# Patient Record
Sex: FEMALE | Race: WHITE | Employment: FULL TIME | URBAN - METROPOLITAN AREA
[De-identification: names, ages, dates, MRNs, and addresses within clinical notes are randomized per-mention and may not be internally consistent; named-entity substitution may affect disease eponyms.]

---

## 2020-09-13 ENCOUNTER — APPOINTMENT (OUTPATIENT)
Dept: GENERAL RADIOLOGY | Age: 45
End: 2020-09-13
Payer: COMMERCIAL

## 2020-09-13 ENCOUNTER — HOSPITAL ENCOUNTER (OUTPATIENT)
Age: 45
Setting detail: OBSERVATION
Discharge: HOME | End: 2020-09-14
Attending: EMERGENCY MEDICINE | Admitting: INTERNAL MEDICINE
Payer: COMMERCIAL

## 2020-09-13 PROBLEM — R07.9 CHEST PAIN: Status: ACTIVE | Noted: 2020-09-13

## 2020-09-13 LAB
ACETAMINOPHEN LEVEL: <5 MCG/ML (ref 10–30)
ALBUMIN SERPL-MCNC: 4 G/DL (ref 3.5–5.2)
ALP BLD-CCNC: 76 U/L (ref 35–104)
ALT SERPL-CCNC: 13 U/L (ref 0–32)
AMPHETAMINE SCREEN, URINE: NOT DETECTED
ANION GAP SERPL CALCULATED.3IONS-SCNC: 14 MMOL/L (ref 7–16)
AST SERPL-CCNC: 16 U/L (ref 0–31)
BARBITURATE SCREEN URINE: NOT DETECTED
BASOPHILS ABSOLUTE: 0.03 E9/L (ref 0–0.2)
BASOPHILS RELATIVE PERCENT: 0.5 % (ref 0–2)
BENZODIAZEPINE SCREEN, URINE: NOT DETECTED
BILIRUB SERPL-MCNC: <0.2 MG/DL (ref 0–1.2)
BUN BLDV-MCNC: 11 MG/DL (ref 6–20)
CALCIUM SERPL-MCNC: 8.9 MG/DL (ref 8.6–10.2)
CANNABINOID SCREEN URINE: POSITIVE
CHLORIDE BLD-SCNC: 99 MMOL/L (ref 98–107)
CK MB: 2.1 NG/ML (ref 0–4.3)
CO2: 22 MMOL/L (ref 22–29)
COCAINE METABOLITE SCREEN URINE: NOT DETECTED
CREAT SERPL-MCNC: 0.6 MG/DL (ref 0.5–1)
EOSINOPHILS ABSOLUTE: 0.23 E9/L (ref 0.05–0.5)
EOSINOPHILS RELATIVE PERCENT: 3.5 % (ref 0–6)
ETHANOL: <10 MG/DL (ref 0–0.08)
FENTANYL SCREEN, URINE: NOT DETECTED
GFR AFRICAN AMERICAN: >60
GFR NON-AFRICAN AMERICAN: >60 ML/MIN/1.73
GLUCOSE BLD-MCNC: 108 MG/DL (ref 74–99)
HBA1C MFR BLD: 5.2 % (ref 4–5.6)
HCT VFR BLD CALC: 29.8 % (ref 34–48)
HEMOGLOBIN: 9.4 G/DL (ref 11.5–15.5)
IMMATURE GRANULOCYTES #: 0.03 E9/L
IMMATURE GRANULOCYTES %: 0.5 % (ref 0–5)
LYMPHOCYTES ABSOLUTE: 1.97 E9/L (ref 1.5–4)
LYMPHOCYTES RELATIVE PERCENT: 29.9 % (ref 20–42)
Lab: ABNORMAL
MCH RBC QN AUTO: 26.7 PG (ref 26–35)
MCHC RBC AUTO-ENTMCNC: 31.5 % (ref 32–34.5)
MCV RBC AUTO: 84.7 FL (ref 80–99.9)
METHADONE SCREEN, URINE: NOT DETECTED
MONOCYTES ABSOLUTE: 0.43 E9/L (ref 0.1–0.95)
MONOCYTES RELATIVE PERCENT: 6.5 % (ref 2–12)
NEUTROPHILS ABSOLUTE: 3.89 E9/L (ref 1.8–7.3)
NEUTROPHILS RELATIVE PERCENT: 59.1 % (ref 43–80)
OPIATE SCREEN URINE: NOT DETECTED
OXYCODONE URINE: NOT DETECTED
PDW BLD-RTO: 13.4 FL (ref 11.5–15)
PHENCYCLIDINE SCREEN URINE: NOT DETECTED
PLATELET # BLD: 348 E9/L (ref 130–450)
PMV BLD AUTO: 10 FL (ref 7–12)
POTASSIUM SERPL-SCNC: 3.8 MMOL/L (ref 3.5–5)
RBC # BLD: 3.52 E12/L (ref 3.5–5.5)
SALICYLATE, SERUM: <0.3 MG/DL (ref 0–30)
SODIUM BLD-SCNC: 135 MMOL/L (ref 132–146)
TOTAL CK: 123 U/L (ref 20–180)
TOTAL PROTEIN: 6.9 G/DL (ref 6.4–8.3)
TRICYCLIC ANTIDEPRESSANTS SCREEN SERUM: NEGATIVE NG/ML
TROPONIN: <0.01 NG/ML (ref 0–0.03)
TROPONIN: <0.01 NG/ML (ref 0–0.03)
WBC # BLD: 6.6 E9/L (ref 4.5–11.5)

## 2020-09-13 PROCEDURE — 80307 DRUG TEST PRSMV CHEM ANLYZR: CPT

## 2020-09-13 PROCEDURE — 99283 EMERGENCY DEPT VISIT LOW MDM: CPT

## 2020-09-13 PROCEDURE — 96372 THER/PROPH/DIAG INJ SC/IM: CPT

## 2020-09-13 PROCEDURE — 2580000003 HC RX 258: Performed by: STUDENT IN AN ORGANIZED HEALTH CARE EDUCATION/TRAINING PROGRAM

## 2020-09-13 PROCEDURE — 6360000002 HC RX W HCPCS: Performed by: INTERNAL MEDICINE

## 2020-09-13 PROCEDURE — 36415 COLL VENOUS BLD VENIPUNCTURE: CPT

## 2020-09-13 PROCEDURE — 84484 ASSAY OF TROPONIN QUANT: CPT

## 2020-09-13 PROCEDURE — 82553 CREATINE MB FRACTION: CPT

## 2020-09-13 PROCEDURE — G0378 HOSPITAL OBSERVATION PER HR: HCPCS

## 2020-09-13 PROCEDURE — 85025 COMPLETE CBC W/AUTO DIFF WBC: CPT

## 2020-09-13 PROCEDURE — 82550 ASSAY OF CK (CPK): CPT

## 2020-09-13 PROCEDURE — 93005 ELECTROCARDIOGRAM TRACING: CPT | Performed by: STUDENT IN AN ORGANIZED HEALTH CARE EDUCATION/TRAINING PROGRAM

## 2020-09-13 PROCEDURE — G0480 DRUG TEST DEF 1-7 CLASSES: HCPCS

## 2020-09-13 PROCEDURE — 80053 COMPREHEN METABOLIC PANEL: CPT

## 2020-09-13 PROCEDURE — 83036 HEMOGLOBIN GLYCOSYLATED A1C: CPT

## 2020-09-13 PROCEDURE — 71046 X-RAY EXAM CHEST 2 VIEWS: CPT

## 2020-09-13 RX ORDER — DOCUSATE SODIUM 100 MG/1
100 CAPSULE, LIQUID FILLED ORAL NIGHTLY
Status: DISCONTINUED | OUTPATIENT
Start: 2020-09-13 | End: 2020-09-14 | Stop reason: HOSPADM

## 2020-09-13 RX ORDER — ONDANSETRON 2 MG/ML
4 INJECTION INTRAMUSCULAR; INTRAVENOUS ONCE
Status: DISCONTINUED | OUTPATIENT
Start: 2020-09-13 | End: 2020-09-14 | Stop reason: HOSPADM

## 2020-09-13 RX ORDER — ONDANSETRON 2 MG/ML
4 INJECTION INTRAMUSCULAR; INTRAVENOUS EVERY 6 HOURS PRN
Status: DISCONTINUED | OUTPATIENT
Start: 2020-09-13 | End: 2020-09-14 | Stop reason: HOSPADM

## 2020-09-13 RX ORDER — RIBOFLAVIN (VITAMIN B2) 100 MG
100 TABLET ORAL DAILY
COMMUNITY

## 2020-09-13 RX ORDER — PANTOPRAZOLE SODIUM 40 MG/1
40 TABLET, DELAYED RELEASE ORAL
Status: DISCONTINUED | OUTPATIENT
Start: 2020-09-14 | End: 2020-09-14 | Stop reason: HOSPADM

## 2020-09-13 RX ORDER — ACETAMINOPHEN 325 MG/1
650 TABLET ORAL EVERY 4 HOURS PRN
Status: DISCONTINUED | OUTPATIENT
Start: 2020-09-13 | End: 2020-09-14 | Stop reason: HOSPADM

## 2020-09-13 RX ORDER — DULOXETIN HYDROCHLORIDE 60 MG/1
60 CAPSULE, DELAYED RELEASE ORAL DAILY
COMMUNITY

## 2020-09-13 RX ORDER — 0.9 % SODIUM CHLORIDE 0.9 %
1000 INTRAVENOUS SOLUTION INTRAVENOUS ONCE
Status: COMPLETED | OUTPATIENT
Start: 2020-09-13 | End: 2020-09-13

## 2020-09-13 RX ORDER — LORATADINE 10 MG/1
10 TABLET ORAL DAILY
COMMUNITY

## 2020-09-13 RX ORDER — DULOXETIN HYDROCHLORIDE 60 MG/1
60 CAPSULE, DELAYED RELEASE ORAL DAILY
Status: DISCONTINUED | OUTPATIENT
Start: 2020-09-13 | End: 2020-09-14 | Stop reason: HOSPADM

## 2020-09-13 RX ORDER — ASPIRIN 81 MG/1
81 TABLET ORAL DAILY
Status: DISCONTINUED | OUTPATIENT
Start: 2020-09-13 | End: 2020-09-14 | Stop reason: HOSPADM

## 2020-09-13 RX ORDER — CETIRIZINE HYDROCHLORIDE 10 MG/1
10 TABLET ORAL DAILY
Status: DISCONTINUED | OUTPATIENT
Start: 2020-09-13 | End: 2020-09-14 | Stop reason: HOSPADM

## 2020-09-13 RX ADMIN — ENOXAPARIN SODIUM 40 MG: 40 INJECTION SUBCUTANEOUS at 21:42

## 2020-09-13 RX ADMIN — SODIUM CHLORIDE 1000 ML: 9 INJECTION, SOLUTION INTRAVENOUS at 17:19

## 2020-09-13 ASSESSMENT — ENCOUNTER SYMPTOMS
PHOTOPHOBIA: 0
COUGH: 0
VISUAL CHANGE: 0
SHORTNESS OF BREATH: 0
WHEEZING: 0
SORE THROAT: 0
RHINORRHEA: 0
CHEST TIGHTNESS: 1
TROUBLE SWALLOWING: 0
NAUSEA: 1
BLOOD IN STOOL: 0
DIARRHEA: 0
VOICE CHANGE: 0
VOMITING: 0

## 2020-09-13 ASSESSMENT — HEART SCORE: ECG: 1

## 2020-09-13 ASSESSMENT — PAIN DESCRIPTION - ORIENTATION: ORIENTATION: MID

## 2020-09-13 ASSESSMENT — PAIN SCALES - GENERAL: PAINLEVEL_OUTOF10: 5

## 2020-09-13 ASSESSMENT — PAIN DESCRIPTION - DESCRIPTORS: DESCRIPTORS: TIGHTNESS

## 2020-09-13 ASSESSMENT — PAIN DESCRIPTION - PAIN TYPE: TYPE: ACUTE PAIN

## 2020-09-13 ASSESSMENT — PAIN DESCRIPTION - LOCATION: LOCATION: CHEST

## 2020-09-13 NOTE — H&P
has diabetes and heart disease,, mom has RA  REVIEW OF SYSTEMS:   Pertinent positives as noted in the HPI. All other systems reviewed and negative. PHYSICAL EXAM:    BP (!) 150/98   Pulse 100   Temp 98.7 °F (37.1 °C) (Temporal)   Resp 16   Ht 5' 6\" (1.676 m)   Wt 220 lb (99.8 kg)   SpO2 99%   BMI 35.51 kg/m²     General appearance:  No apparent distress, appears stated age and cooperative. HEENT:  Normal cephalic, atraumatic without obvious deformity. Pupils equal, round, and reactive to light. Extra ocular muscles intact. Conjunctivae/corneas clear. Neck: Supple, with full range of motion. No jugular venous distention. Trachea midline. Respiratory:  Normal respiratory effort. Clear to auscultation, bilaterally without Rales/Wheezes/Rhonchi. Cardiovascular:  Regular rate and rhythm with normal S1/S2 without murmurs, rubs or gallops. Abdomen: Soft, non-tender, non-distended with normal bowel sounds. Musculoskeletal:  No clubbing, cyanosis or edema bilaterally. Full range of motion without deformity. Skin: Skin color, texture, turgor normal.  No rashes or lesions. Neurologic:  Neurovascularly intact without any focal sensory/motor deficits. Cranial nerves: II-XII intact, grossly non-focal.  Psychiatric:  Alert and oriented, thought content appropriate, normal insight  Capillary Refill: Brisk,< 3 seconds   Peripheral Pulses: +2 palpable, equal bilaterally       Labs:     Recent Labs     09/13/20  1602   WBC 6.6   HGB 9.4*   HCT 29.8*        Recent Labs     09/13/20  1602      K 3.8   CL 99   CO2 22   BUN 11   CREATININE 0.6   CALCIUM 8.9     Recent Labs     09/13/20  1602   AST 16   ALT 13   BILITOT <0.2   ALKPHOS 76     No results for input(s): INR in the last 72 hours.   Recent Labs     09/13/20  1602   TROPONINI <0.01       Urinalysis:    No results found for: Donneta Pott, BACTERIA, RBCUA, BLOODU, [address], GLUCOSEU    Radiology:     CXR: I have reviewed the CXR with the following interpretation:  XR CHEST (2 VW)   Final Result   Minimal atelectasis or scarring in the lower left lung. Otherwise, the   lungs are clear. ASSESSMENT:    Active Hospital Problems    Diagnosis Date Noted    Chest pain [R07.9] 09/13/2020   depression   palpitations  Marijuana use   tobacco use   Asthma without exacerbation  hyperglycemia  PLAN:  Card  Cycle enzymes  Home meds  aspirin    DVT Prophylaxis: *lovenox*  Diet: cardiac  Code Status:  Full code    PT/OT Eval Status: *na    Dispo - *home    Electronically signed by Skyla Loza DO on 9/13/2020 at 6:15 PM Tim       Thank you Kristine Emerson DO for the opportunity to be involved in this patient's care. If you have any questions or concerns please feel free to contact me at [phone].

## 2020-09-13 NOTE — ED PROVIDER NOTES
Patient is a 78-year-old female that presents the emergency department for evaluation of dizziness. Patient states that late last night around 2 AM she had chest tightness in the center of her chest that woke her up. She took Tums and with discomfort improved. States she has not had any chest tightness since that time. Patient states that she did feel off a little bit throughout the day but is unable to further describe this sensation. She states that roughly 35 minutes prior to arrival she took a hit from a vapor. Patient states that she has done this in the past however is concerned that the baby may have been tainted. Roughly 15 minutes after taking the hip she had sudden onset of dizziness which she describes as a lightheaded sensation. Nothing seemed to make it better or worse. Is been constant and mild in severity. She is not fallen or hit her head. She admits to some numbness and tingling of the shoulders bilaterally. She is never had this happen before. She also admits to mild nausea without vomiting. She denies chest pain, shortness of breath, fever, chills, cough, congestion, abdominal pain, change in bowel habits, change in urinary habit. The history is provided by the patient. Dizziness   Quality:  Lightheadedness  Severity:  Moderate  Onset quality:  Sudden  Duration: 25 minutes. Timing:  Constant  Progression:  Unchanged  Chronicity:  New  Relieved by:  Lying down and closing eyes  Worsened by:  Nothing  Ineffective treatments:  None tried  Associated symptoms: nausea    Associated symptoms: no blood in stool, no chest pain, no diarrhea, no headaches, no palpitations, no shortness of breath, no syncope, no vision changes, no vomiting and no weakness    Risk factors: no hx of stroke, no hx of vertigo, no Meniere's disease, no multiple medications and no new medications         Review of Systems   Constitutional: Negative for chills, diaphoresis, fatigue and fever.    HENT: Negative for congestion, postnasal drip, rhinorrhea, sore throat, trouble swallowing and voice change. Eyes: Negative for photophobia and visual disturbance. Respiratory: Positive for chest tightness. Negative for cough, shortness of breath and wheezing. Cardiovascular: Negative for chest pain, palpitations, leg swelling and syncope. Gastrointestinal: Positive for nausea. Negative for blood in stool, diarrhea and vomiting. Genitourinary: Negative for decreased urine volume, difficulty urinating, dysuria, flank pain, frequency and urgency. Musculoskeletal: Negative for myalgias. Skin: Negative for pallor and rash. Neurological: Positive for dizziness and light-headedness. Negative for tremors, seizures, syncope, facial asymmetry, speech difficulty, weakness, numbness and headaches. Physical Exam  Vitals signs and nursing note reviewed. Constitutional:       General: She is not in acute distress. Appearance: Normal appearance. She is obese. She is not ill-appearing or diaphoretic. HENT:      Head: Normocephalic and atraumatic. Mouth/Throat:      Mouth: Mucous membranes are moist.   Eyes:      Extraocular Movements: Extraocular movements intact. Conjunctiva/sclera: Conjunctivae normal.      Comments: Patient has slight anisocoria with a slightly larger right pupil than the left which patient states is chronic. They are both reactive to light and accommodation. Cardiovascular:      Rate and Rhythm: Normal rate and regular rhythm. Pulses: Normal pulses. Heart sounds: Normal heart sounds. No murmur. Pulmonary:      Effort: Pulmonary effort is normal. No respiratory distress. Breath sounds: Normal breath sounds. No wheezing or rhonchi. Chest:      Chest wall: No tenderness. Abdominal:      General: Abdomen is flat. Palpations: Abdomen is soft. Tenderness: There is no abdominal tenderness. There is no guarding or rebound.    Musculoskeletal:         General: No swelling or tenderness. Lymphadenopathy:      Cervical: No cervical adenopathy. Neurological:      Mental Status: She is alert and oriented to person, place, and time. Cranial Nerves: No cranial nerve deficit. Sensory: Sensory deficit present. Motor: No weakness. Procedures     MDM  Number of Diagnoses or Management Options  Chest pain, unspecified type:   Dizziness:   Diagnosis management comments: Patient is a 66-year-old female that presents emergency department for evaluation of dizziness. Patient resting comfortably and in no apparent distress on exam.  Concern that dizziness may be cardiogenic in nature given patient's history of syncopal episodes and current cardiac work-up of a loop recorder. Blood work was ordered which was unremarkable including normal troponin and normal electrolytes. EKG showed normal sinus rhythm with LVH and an age-indeterminate inferior and anterior infarct. No prior EKGs were in the system to compare. Chest x-ray showed no signs of pneumomediastinum, pneumothorax, pneumonia, widened mediastinum. Given patient's significant cardiac history she was admitted for observation and cardiac evaluation. Patient agreeable to plan. Discussed case with hospitalist who agreed to admit patient. She remained hemodynamically stable throughout stay in the emergency department.               --------------------------------------------- PAST HISTORY ---------------------------------------------  Past Medical History:  has no past medical history on file. Past Surgical History:  has a past surgical history that includes knee surgery and Tubal ligation. Social History:  reports that she has been smoking. She does not have any smokeless tobacco history on file. She reports current alcohol use. She reports that she does not use drugs. Family History: family history is not on file. The patients home medications have been reviewed.     Allergies: Imitrex [sumatriptan];  Amoxicillin; and Pcn [penicillins]    -------------------------------------------------- RESULTS -------------------------------------------------    LABS:  Results for orders placed or performed during the hospital encounter of 09/13/20   CBC Auto Differential   Result Value Ref Range    WBC 6.6 4.5 - 11.5 E9/L    RBC 3.52 3.50 - 5.50 E12/L    Hemoglobin 9.4 (L) 11.5 - 15.5 g/dL    Hematocrit 29.8 (L) 34.0 - 48.0 %    MCV 84.7 80.0 - 99.9 fL    MCH 26.7 26.0 - 35.0 pg    MCHC 31.5 (L) 32.0 - 34.5 %    RDW 13.4 11.5 - 15.0 fL    Platelets 771 965 - 267 E9/L    MPV 10.0 7.0 - 12.0 fL    Neutrophils % 59.1 43.0 - 80.0 %    Immature Granulocytes % 0.5 0.0 - 5.0 %    Lymphocytes % 29.9 20.0 - 42.0 %    Monocytes % 6.5 2.0 - 12.0 %    Eosinophils % 3.5 0.0 - 6.0 %    Basophils % 0.5 0.0 - 2.0 %    Neutrophils Absolute 3.89 1.80 - 7.30 E9/L    Immature Granulocytes # 0.03 E9/L    Lymphocytes Absolute 1.97 1.50 - 4.00 E9/L    Monocytes Absolute 0.43 0.10 - 0.95 E9/L    Eosinophils Absolute 0.23 0.05 - 0.50 E9/L    Basophils Absolute 0.03 0.00 - 0.20 E9/L   Comprehensive Metabolic Panel   Result Value Ref Range    Sodium 135 132 - 146 mmol/L    Potassium 3.8 3.5 - 5.0 mmol/L    Chloride 99 98 - 107 mmol/L    CO2 22 22 - 29 mmol/L    Anion Gap 14 7 - 16 mmol/L    Glucose 108 (H) 74 - 99 mg/dL    BUN 11 6 - 20 mg/dL    CREATININE 0.6 0.5 - 1.0 mg/dL    GFR Non-African American >60 >=60 mL/min/1.73    GFR African American >60     Calcium 8.9 8.6 - 10.2 mg/dL    Total Protein 6.9 6.4 - 8.3 g/dL    Alb 4.0 3.5 - 5.2 g/dL    Total Bilirubin <0.2 0.0 - 1.2 mg/dL    Alkaline Phosphatase 76 35 - 104 U/L    ALT 13 0 - 32 U/L    AST 16 0 - 31 U/L   Troponin   Result Value Ref Range    Troponin <0.01 0.00 - 0.03 ng/mL   Serum Drug Screen   Result Value Ref Range    Ethanol Lvl <10 mg/dL    Acetaminophen Level <5.0 (L) 10.0 - 87.8 mcg/mL    Salicylate, Serum <4.3 0.0 - 30.0 mg/dL    TCA Scrn NEGATIVE Cutoff:300 ng/mL answered at this time and they are agreeable with the plan of admission.    --------------------------------- ADDITIONAL PROVIDER NOTES ---------------------------------  Consultations:  Time: 0138. Spoke with Dr. Kiki Galeana. Discussed case. They will admit the patient. This patient's ED course included: a personal history and physicial examination, re-evaluation prior to disposition, multiple bedside re-evaluations, cardiac monitoring and continuous pulse oximetry    This patient has remained hemodynamically stable during their ED course. Diagnosis:  1. Dizziness    2. Chest pain, unspecified type        Disposition:  Patient's disposition: Admit to telemetry  Patient's condition is stable.          Eric Gregorio.,   Resident  09/13/20 [id number]

## 2020-09-14 ENCOUNTER — APPOINTMENT (OUTPATIENT)
Dept: NON INVASIVE DIAGNOSTICS | Age: 45
End: 2020-09-14
Payer: COMMERCIAL

## 2020-09-14 VITALS
WEIGHT: 220 LBS | SYSTOLIC BLOOD PRESSURE: 131 MMHG | TEMPERATURE: 96.5 F | HEIGHT: 66 IN | DIASTOLIC BLOOD PRESSURE: 71 MMHG | OXYGEN SATURATION: 96 % | BODY MASS INDEX: 35.36 KG/M2 | RESPIRATION RATE: 16 BRPM | HEART RATE: 78 BPM

## 2020-09-14 PROBLEM — F12.90 MARIJUANA USE: Status: ACTIVE | Noted: 2020-09-14

## 2020-09-14 LAB
ANION GAP SERPL CALCULATED.3IONS-SCNC: 14 MMOL/L (ref 7–16)
BUN BLDV-MCNC: 8 MG/DL (ref 6–20)
CALCIUM SERPL-MCNC: 8.5 MG/DL (ref 8.6–10.2)
CHLORIDE BLD-SCNC: 102 MMOL/L (ref 98–107)
CK MB: 1.9 NG/ML (ref 0–4.3)
CO2: 23 MMOL/L (ref 22–29)
CREAT SERPL-MCNC: 0.7 MG/DL (ref 0.5–1)
EKG ATRIAL RATE: 92 BPM
EKG P AXIS: 33 DEGREES
EKG P-R INTERVAL: 164 MS
EKG Q-T INTERVAL: 372 MS
EKG QRS DURATION: 90 MS
EKG QTC CALCULATION (BAZETT): 460 MS
EKG R AXIS: 5 DEGREES
EKG T AXIS: -2 DEGREES
EKG VENTRICULAR RATE: 92 BPM
GFR AFRICAN AMERICAN: >60
GFR NON-AFRICAN AMERICAN: >60 ML/MIN/1.73
GLUCOSE BLD-MCNC: 102 MG/DL (ref 74–99)
POTASSIUM SERPL-SCNC: 4 MMOL/L (ref 3.5–5)
SODIUM BLD-SCNC: 139 MMOL/L (ref 132–146)
TOTAL CK: 120 U/L (ref 20–180)
TROPONIN: <0.01 NG/ML (ref 0–0.03)

## 2020-09-14 PROCEDURE — 80048 BASIC METABOLIC PNL TOTAL CA: CPT

## 2020-09-14 PROCEDURE — 99244 OFF/OP CNSLTJ NEW/EST MOD 40: CPT | Performed by: INTERNAL MEDICINE

## 2020-09-14 PROCEDURE — G0378 HOSPITAL OBSERVATION PER HR: HCPCS

## 2020-09-14 PROCEDURE — 6370000000 HC RX 637 (ALT 250 FOR IP): Performed by: INTERNAL MEDICINE

## 2020-09-14 PROCEDURE — 93018 CV STRESS TEST I&R ONLY: CPT | Performed by: INTERNAL MEDICINE

## 2020-09-14 PROCEDURE — 84484 ASSAY OF TROPONIN QUANT: CPT

## 2020-09-14 PROCEDURE — 82550 ASSAY OF CK (CPK): CPT

## 2020-09-14 PROCEDURE — 93016 CV STRESS TEST SUPVJ ONLY: CPT | Performed by: INTERNAL MEDICINE

## 2020-09-14 PROCEDURE — 36415 COLL VENOUS BLD VENIPUNCTURE: CPT

## 2020-09-14 PROCEDURE — 93017 CV STRESS TEST TRACING ONLY: CPT

## 2020-09-14 PROCEDURE — 82553 CREATINE MB FRACTION: CPT

## 2020-09-14 PROCEDURE — 93010 ELECTROCARDIOGRAM REPORT: CPT | Performed by: INTERNAL MEDICINE

## 2020-09-14 RX ADMIN — PANTOPRAZOLE SODIUM 40 MG: 40 TABLET, DELAYED RELEASE ORAL at 06:46

## 2020-09-14 RX ADMIN — ASPIRIN 81 MG: 81 TABLET, COATED ORAL at 12:31

## 2020-09-14 RX ADMIN — DULOXETINE HYDROCHLORIDE 60 MG: 60 CAPSULE, DELAYED RELEASE ORAL at 12:31

## 2020-09-14 RX ADMIN — CETIRIZINE HYDROCHLORIDE 10 MG: 10 TABLET, FILM COATED ORAL at 12:31

## 2020-09-14 ASSESSMENT — PAIN SCALES - GENERAL: PAINLEVEL_OUTOF10: 0

## 2020-09-14 NOTE — DISCHARGE SUMMARY
Hospital Medicine Discharge Summary    Patient: Yoan Reese     Gender: female  : 1975   Age: 39 y.o. MRN: [id number]    Admitting Physician: Imani Recinos DO  Discharge Physician: ASTRID Sapp, CNP    Code Status: Full Code     Admit Date: 2020   Discharge Date:   20     Disposition:  Home    Discharge Diagnoses: Active Hospital Problems    Diagnosis Date Noted    Marijuana use [F12.90] 2020    Moderate obesity [E66.8]     Chest pain [R07.9] 2020       Follow-up appointments:  one week    Outpatient to do list: follow up PCP, lifestyle changes    Condition at Discharge:  Stable    Hospital Course:   39 y. o. female who presented to [facility] with *chest pain, onset today, burning in character, located ACW radiating to both arms, it was continuous with nausea, no vomiting, pos diaphoresis, no SOB. Had a similar episode before, was to have an OP stress, has a loop recorder for palpitations.   20: seen by cardiology, recommends exercise stress which did return negative for ischemic response    Discharge Medications:   Current Discharge Medication List        Current Discharge Medication List        Current Discharge Medication List      CONTINUE these medications which have NOT CHANGED    Details   DULoxetine (CYMBALTA) 60 MG extended release capsule Take 60 mg by mouth daily      Cholecalciferol (VITAMIN D3) 125 MCG (5000 UT) CHEW Take 2 tablets by mouth daily      Ascorbic Acid (VITAMIN C) 100 MG tablet Take 100 mg by mouth daily      loratadine (CLARITIN) 10 MG tablet Take 10 mg by mouth daily      albuterol sulfate HFA (PROVENTIL HFA) 108 (90 BASE) MCG/ACT inhaler Inhale 2 puffs into the lungs every 4 hours as needed for Wheezing  Qty: 1 Inhaler, Refills: 1           Current Discharge Medication List          Discharge ROS:  A complete review of systems was asked and negative     Discharge Exam:    /71   Pulse 78   Temp 96.5 °F (35.8 °C) Resp 16   Ht 5' 6\" (1.676 m)   Wt 220 lb (99.8 kg)   SpO2 96%   BMI 35.51 kg/m²   General appearance:  NAD  HEENT:   Normal cephalic, atraumatic, moist mucous membranes, no oropharyngeal erythema or exudate  Neck: Supple, trachea midline, no anterior cervical or SC LAD  Heart[de-identified] Normal s1/s2, RRR, no murmurs, gallops, or rubs. No leg edema  Lungs:  clear bilaterally, no wheeze, no rales or rhonchi, no use of accessory musclesNormal respiratory effort. Clear to auscultation, bilaterally without Rales/Wheezes/Rhonchi. Abdomen: Soft, non-tender, non-distended, bowel sounds present, no masses  Musculoskeletal:  No clubbing, no cyanosis, no edema  Skin: No lesion or masses  Neurologic:  Neurovascularly intact without any focal sensory/motor deficits. Psychiatric:  A & O x3  Neuro: Grossly intact, moves all four extremities     Labs: For convenience and continuity at follow-up the following most recent labs are provided:    Lab Results   Component Value Date    WBC 6.6 2020    HGB 9.4 2020    HCT 29.8 2020    MCV 84.7 2020     2020     2020    K 4.0 2020     2020    CO2 23 2020    BUN 8 2020    CREATININE 0.7 2020    CALCIUM 8.5 2020    ALKPHOS 76 2020    ALT 13 2020    AST 16 2020    BILITOT <0.2 2020    LABALBU 4.0 2020     No results found for: INR    Radiology:  Xr Chest (2 Vw)    Result Date: 2020  Patient MRN: [id number] : 1975 Age:  39 years Gender: Female Order Date: 2020 3:30 PM Exam: XR CHEST (2 VW) Indication:   chest tightness chest tightness Comparison: None. FINDINGS: Small linear area of atelectasis or scarring is seen in the lower left lung. No consolidation is seen. The cardiomediastinal contour is unremarkable. No pneumothorax or pleural effusion. Evaluation of the bones reveals no fracture or destructive lesion. A loop recorder is seen along the left chest wall. Minimal atelectasis or scarring in the lower left lung. Otherwise, the lungs are clear. EKG     Rhythm: normal sinus   Rate: normal  Clinical Impression: non-specific EKG        The patient was seen and examined on day of discharge and this discharge summary is in conjunction with any daily progress note from day of discharge. Time Spent on discharge is 30 minutes  in the examination, evaluation, counseling and review of medications and discharge plan. Note that more than 30 minutes was spent in preparing discharge papers, discussing discharge with patient, medication review, etc.       Signed:    ASTRID Kim - CNS, CNP  9/14/2020      Thank you Ty DO Khang for the opportunity to be involved in this patient's care.  If you have any questions or concerns please feel free to contact me at [phone]

## 2020-09-14 NOTE — CARE COORDINATION
Transition of care: Observation level of care. Met with pt in room. Pt lives with her daughter in a 2 story townhouse. Independent with ADLs and drives. Works full time for [employer]. No DME. Voiced no needs for home. PCP is Dr. Danielle Gilman and pharmacy is [facility] [address]. Discharge order on chart.

## 2020-09-14 NOTE — VIRTUAL HEALTH
INPATIENT CARDIOLOGY CONSULT    Name: George Carrington    Age: 39 y.o. Date of Admission: 9/13/2020  3:11 PM    Date of Service: 9/14/2020    Reason for Consultation: Chest discomfort, moderate obesity    Referring Physician: Lani Page DO    History of Present Illness: The patient is a 51-year-old black female with no association to [facility] cardiology and no known history of structural heart disease. She apparently has a remote history of palpitations and apparent syncope with the remote placement of a loop recorder in [place], [place] approximately 1 decade earlier without available results and at present she cannot recall the implanting cardiologist.  She is normally active with a functional capacity of approximately 5 METs and denies active cardiovascular symptoms. She remained in her usual state of health until the night prior to hospitalization when she was awakened by a somewhat ill-defined discomfort in her lower midsternal/upper precordial region best described as a \"gas bubble\"/pressure-like discomfort lasting approximately 45 minutes duration without radiation or associated ischemic equivalents and resolved following administration of antacids. On the day of hospitalization she initially completed her normal activities without difficulty but while exiting a store and following utilizing electronic cigarette, she noted a similar sensation associated with mild dyspnea and diaphoresis again without radiation or additional ischemic equivalents. This was associated with a transient lightheadedness without near syncope or loss of consciousness and she denies any additional arrhythmia related symptomatology.   Upon presentation to the emergency room, her electrocardiogram reviewed at the time of assessment demonstrated no significant abnormalities in the face of sinus rhythm and a delayed precordial transition zone with no arrhythmias noted during her hospitalization and a chest x-ray again reviewed demonstrated no evidence of abnormalities. Cardiac biomarkers demonstrated no abnormalities with no significant biochemical abnormalities and a toxicology screen demonstrating only evidence of cannabinoid metabolites. .    Review of Systems: The remainder of a complete multisystem review including consitutional, central nervous, respiratory, circulatory, gastrointestinal, genitourinary, endocrinologic, hematologic, musculoskeletal and psychiatric are negative. Past Medical History:  No past medical history on file. Past Surgical History:  Past Surgical History:   Procedure Laterality Date    KNEE SURGERY      TUBAL LIGATION         Family History:  No family history on file. Social History:  Social History     Socioeconomic History    Marital status: Single     Spouse name: Not on file    Number of children: Not on file    Years of education: Not on file    Highest education level: Not on file   Occupational History    Not on file   Social Needs    Financial resource strain: Not on file    Food insecurity     Worry: Not on file     Inability: Not on file    Transportation needs     Medical: Not on file     Non-medical: Not on file   Tobacco Use    Smoking status: Current Some Day Smoker   Substance and Sexual Activity    Alcohol use:  Yes    Drug use: No    Sexual activity: Not on file   Lifestyle    Physical activity     Days per week: Not on file     Minutes per session: Not on file    Stress: Not on file   Relationships    Social connections     Talks on phone: Not on file     Gets together: Not on file     Attends [employer] service: Not on file     Active member of club or organization: Not on file     Attends meetings of clubs or organizations: Not on file     Relationship status: Not on file    Intimate partner violence     Fear of current or ex partner: Not on file     Emotionally abused: Not on file     Physically abused: Not on file     Forced sexual activity: Not on injection 4 mg  4 mg Intravenous Q6H PRN Sofia Stager, DO        acetaminophen (TYLENOL) tablet 650 mg  650 mg Oral Q4H PRN Jareth Rico Layer, DO        aspirin EC tablet 81 mg  81 mg Oral Daily Jareth Rico Layer, DO        influenza quadrivalent split vaccine (FLUZONE;FLUARIX;FLULAVAL;AFLURIA) injection 0.5 mL  0.5 mL Intramuscular Prior to discharge Sofia Stager, DO               Physical Exam:  /66   Pulse 88   Temp 97.7 °F (36.5 °C) (Temporal)   Resp 18   Ht 5' 6\" (1.676 m)   Wt 220 lb (99.8 kg)   SpO2 97%   BMI 35.51 kg/m²   Weight change: Wt Readings from Last 3 Encounters:   09/13/20 220 lb (99.8 kg)   01/14/17 220 lb (99.8 kg)     The patient is awake, alert and in no discomfort or distress. No gross musculoskeletal deformity or lymphadenopathy are present. No significant skin or nail changes are present. Gross examination of head, eyes, nose and throat are negative. Jugular venous pressure is normal and no carotid bruits are present. No thyromegaly is noted. Normal respiratory effort is noted with no accessory muscle usage present. Lung fields are clear to ascultation. Cardiac examination is notable for a regular rate and rhythm with no palpable thrill. No gallop rhythm or cardiac murmur are identified. A benign abdominal examination is present with the exception of obesity and no masses or organomegaly. A normal abdominal aortic pulsation is present. Intact pulses are present throughout all extremities and no peripheral edema is present. No focal neurologic deficits are present. Intake/Output:  No intake or output data in the 24 hours ending 09/14/20 0744  No intake/output data recorded.     Laboratory Tests:  Lab Results   Component Value Date    CREATININE 0.7 09/14/2020    BUN 8 09/14/2020     09/14/2020    K 4.0 09/14/2020     09/14/2020    CO2 23 09/14/2020     Recent Labs     09/13/20  1915 09/14/20  0051   CKTOTAL 123 120   CKMB 2.1 1.9     No results found for: BNP  Lab Results   Component Value Date    WBC 6.6 09/13/2020    RBC 3.52 09/13/2020    HGB 9.4 09/13/2020    HCT 29.8 09/13/2020    MCV 84.7 09/13/2020    MCH 26.7 09/13/2020    MCHC 31.5 09/13/2020    RDW 13.4 09/13/2020     09/13/2020    MPV 10.0 09/13/2020     Recent Labs     09/13/20  1602   ALKPHOS 76   ALT 13   AST 16   PROT 6.9   BILITOT <0.2   LABALBU 4.0     No results found for: MG  No results found for: PROTIME, INR  No results found for: TSH  No components found for: CHLPL  No results found for: TRIG  No results found for: HDL  No results found for: [facility]      Cardiac Tests:  ECG: A resting electrocardiogram reviewed at the time of evaluation demonstrates evidence of sinus rhythm with a delayed precordial transition zone  Telemetry findings reviewed: sinus rhythm, no new tachy/bradyarrhythmias overnight  Chest X-ray: A chest x-ray reviewed at the time of evaluation demonstrates no evidence of cardiomegaly or infiltrate      ASSESSMENT / PLAN: On a clinical basis, the patient presents with somewhat atypically described chest discomfort in the face of a risk profile suggestive of a low to intermediate risk of coronary atherosclerosis. On this basis, both on a diagnostic and prognostic basis and exercise stress test has been recommended with results determining needs of additional cardiovascular evaluation. Independent of findings, extensive discussion regarding needs of appropriate lifestyle modification inclusive of smoking cessation and weight reduction, the latter to benefit diastolic cardiac performance and assist in reducing her risk of the development of obstructive sleep apnea with its associated adverse cardiovascular effects has been undertaken. Appropriate risk factor modification of blood pressure and serum lipids will remain essential to reducing risk of future atherosclerotic development. Thank you for allowing me to participate in your patient's care.  Please feel free to contact me if you have any questions or concerns. Note: This report was completed using computerized voice recognition software. Every effort has been made to ensure accuracy, however; inadvertent computerized transcription errors may be present. Delaney Alexandre.  Kelsie Andujar, [address] [place] [address]